# Patient Record
Sex: MALE | Race: WHITE | URBAN - METROPOLITAN AREA
[De-identification: names, ages, dates, MRNs, and addresses within clinical notes are randomized per-mention and may not be internally consistent; named-entity substitution may affect disease eponyms.]

---

## 2018-06-27 ENCOUNTER — TRANSFERRED RECORDS (OUTPATIENT)
Dept: HEALTH INFORMATION MANAGEMENT | Facility: CLINIC | Age: 33
End: 2018-06-27

## 2018-08-14 DIAGNOSIS — H53.10 SUBJECTIVE VISUAL DISTURBANCE: Primary | ICD-10-CM

## 2018-08-16 ENCOUNTER — OFFICE VISIT (OUTPATIENT)
Dept: OPHTHALMOLOGY | Facility: CLINIC | Age: 33
End: 2018-08-16
Attending: OPHTHALMOLOGY
Payer: COMMERCIAL

## 2018-08-16 DIAGNOSIS — H50.22 HYPERTROPIA OF LEFT EYE: Primary | ICD-10-CM

## 2018-08-16 DIAGNOSIS — H53.10 SUBJECTIVE VISUAL DISTURBANCE: ICD-10-CM

## 2018-08-16 PROCEDURE — G0463 HOSPITAL OUTPT CLINIC VISIT: HCPCS | Mod: ZF

## 2018-08-16 PROCEDURE — 92060 SENSORIMOTOR EXAMINATION: CPT | Mod: ZF | Performed by: OPHTHALMOLOGY

## 2018-08-16 ASSESSMENT — EXTERNAL EXAM - RIGHT EYE: OD_EXAM: NORMAL

## 2018-08-16 ASSESSMENT — VISUAL ACUITY
OS_CC: 20/20
METHOD: SNELLEN - LINEAR
CORRECTION_TYPE: GLASSES
OD_CC: 20/20

## 2018-08-16 ASSESSMENT — CONF VISUAL FIELD
OS_NORMAL: 1
OD_NORMAL: 1

## 2018-08-16 ASSESSMENT — CUP TO DISC RATIO
OS_RATIO: 0.3
OD_RATIO: 0.3

## 2018-08-16 ASSESSMENT — REFRACTION_WEARINGRX
OD_SPHERE: -2.25
OS_CYLINDER: +1.00
OS_AXIS: 077
OS_SPHERE: -2.75
OD_CYLINDER: +0.50
OD_AXIS: 164

## 2018-08-16 ASSESSMENT — TONOMETRY
IOP_METHOD: ICARE
OD_IOP_MMHG: 10
OS_IOP_MMHG: 10

## 2018-08-16 ASSESSMENT — SLIT LAMP EXAM - LIDS
COMMENTS: NORMAL
COMMENTS: NORMAL

## 2018-08-16 ASSESSMENT — EXTERNAL EXAM - LEFT EYE: OS_EXAM: NORMAL

## 2018-08-16 NOTE — MR AVS SNAPSHOT
After Visit Summary   2018    Blaine Griffin    MRN: [id number]           Patient Information     Date Of Birth          1985        Visit Information        Provider Department      2018 1:30 PM [facility]        Today's Diagnoses     Hypertropia of left eye    -  1    Subjective visual disturbance           Follow-ups after your visit        Who to contact     Please call your clinic at [phone] to:    Ask questions about your health    Make or cancel appointments    Discuss your medicines    Learn about your test results    Speak to your doctor            Additional Information About Your Visit        MyChart Information     [employer] is an electronic gateway that provides easy, online access to your medical records. With [employer], you can request a clinic appointment, read your test results, renew a prescription or communicate with your care team.     To sign up for [employer] visit the website at [email or web address]   You will be asked to enter the access code listed below, as well as some personal information. Please follow the directions to create your username and password.     Your access code is: [email or web address]  Expires: 10/31/2018  6:30 AM     Your access code will  in 90 days. If you need help or a new code, please contact your [facility] or call [phone] for assistance.        Care EveryWhere ID     This is your Care EveryWhere ID. This could be used by other organizations to access your [facility] medical records  [id number]         Blood Pressure from Last 3 Encounters:   07/15/16 130/88    Weight from Last 3 Encounters:   07/15/16 82.6 kg (182 lb)              We Performed the Following     DILATED FUNDUS EXAM     IOP Measurement     Sensorimotor          Today's Medication Changes          These changes are accurate as of 18  3:06 PM.  If you have any questions, ask your nurse or doctor.                Stop taking these medicines if you haven't already. Please contact your care team if you have questions.     acetaminophen-codeine 300-30 MG per tablet   Commonly known as:  TYLENOL w/CODEINE No. 3   Stopped by:  Ganesh Cardenas MD                    Primary Care Provider    None Specified       No primary provider on file.        Equal Access to Services     [facility]: Hadii rodrigo ku hadasho Soomaali, waaxda luqadaha, qaybta kaalmada adeegyada, waxay martine garciaailinquintin boles . So [facility] [place] [facility] [phone].    ATENCIÓN: Si habla español, tiene a javed disposición servicios gratuitos de asistencia lingüística. [facility] [phone].    We comply with applicable federal civil rights laws and [place] laws. We do not discriminate on the basis of race, color, national origin, age, disability, sex, sexual orientation, or gender identity.            Thank you!     Thank you for choosing [facility]  for your care. Our goal is always to provide you with excellent care. Hearing back from our patients is one way we can continue to improve our services. Please take a few minutes to complete the written survey that you may receive in the mail after your visit with us. Thank you!             Your Updated Medication List - Protect others around you: Learn how to safely use, store and throw away your medicines at www.disposemymeds.org.      Notice  As of 8/16/2018  3:06 PM    You have not been prescribed any medications.

## 2018-08-16 NOTE — LETTER
"2018         RE:  :  MRN: Blaine Griffin  1985  [id number]     Dear Dr. Mars,    Thank you for asking me to see your very pleasant patient, Blaine Griffin, in neuro-ophthalmic consultation.  I would like to thank you for sending your records and I have summarized them in the history of present illness.  My assessment and plan are below.  For further details, please see my attached clinic note.          Assessment & Plan     Blaine Griffin is a 33 year old male with the following diagnoses:   1. Hypertropia of left eye    2. Subjective visual disturbance       Referred by Dr. Mars for evaluation of eye strain. 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 S/p left inferior oblique recession 12mm 2016. Three months postop, was having difficulty with eye strain, particularly at mid-distance. Tried to update glasses prescription shortly thereafter without improvement. Feel eye discomfort in both eyes, particularly when looking at someone across the table at a meeting or changing focus between his computer and a mid-distance target. Experiences \"visual distortion\" when trying to focus on something at mid-distance after a few seconds, like things are not as sharp and the periphery is blurred. The longer he looks at a certain distance, the more comfortable it gets. Not improved with glasses off. Does not help to cover one eye. Symptoms have been pretty stable since they started 2 years ago. Denies diplopia or associated headaches.    No other ocular or medical history. No medications.    Family history of trigeminal neuralgia in father.    Best corrected visual acuity today is 20/20 both eyes. Normal intraocular pressures and color vision in both eyes. Pupils briskly reactive with relative afferent pupillary defect. Confrontational visual fields and extraocular movements full in both eyes. 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 Slit lamp exam is normal. Dilated fundus exam shows healthy nerves and maculae.    I am not sure what he is describing exactly.  It doesn't seem like an eye alignment issue given that it does not improve substantially with monocular eye closure. He notes that it is ok at close near distances making latent hyperopia or poor accommodation less likely.   I do not believe that his symptoms warrant an MRI at this time. Would recommend observation.  Follow up as needed for worsening symptoms.           Again, thank you for allowing me to participate in the care of your patient.      Sincerely,    Ganesh Cardenas MD  Professor, Neuro-Ophthalmology  Department of Ophthalmology and Visual Neurosciences  [facility]

## 2018-08-16 NOTE — NURSING NOTE
Chief Complaints and History of Present Illnesses   Patient presents with     Neurologic Problem     DIPLOPIA     HPI    Symptoms:              Comments:  Blaine is a 33 year old male presenting for complaints of:    1. Subjective visual disturbance.   - S/p LEE recession 07/2016.   - History of Left fourth nerve palsy. Longstanding. Started experiencing diplopia in college.   - no diplopia, but experiences discomfort and visual distortion at different distances. Strain.     Maciej LAL 1:47 PM August 16, 2018

## 2018-08-16 NOTE — PROGRESS NOTES
"       Assessment & Plan     Blaine Griffin is a 33 year old male with the following diagnoses:   1. Hypertropia of left eye    2. Subjective visual disturbance       Referred by Dr. Mars for evaluation of eye strain. 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 S/p left inferior oblique recession 12mm July 2016. Three months postop, was having difficulty with eye strain, particularly at mid-distance. Tried to update glasses prescription shortly thereafter without improvement. Feel eye discomfort in both eyes, particularly when looking at someone across the table at a meeting or changing focus between his computer and a mid-distance target. Experiences \"visual distortion\" when trying to focus on something at mid-distance after a few seconds, like things are not as sharp and the periphery is blurred. The longer he looks at a certain distance, the more comfortable it gets. Not improved with glasses off. Does not help to cover one eye. Symptoms have been pretty stable since they started 2 years ago. Denies diplopia or associated headaches.    No other ocular or medical history. No medications.    Family history of trigeminal neuralgia in father.    Best corrected visual acuity today is 20/20 both eyes. Normal intraocular pressures and color vision in both eyes. Pupils briskly reactive with relative afferent pupillary defect. Confrontational visual fields and extraocular movements full in both eyes. There is a 2 prism diopter left hypertropia and 4 prism diopter exotropia in primary gaze. Slit lamp exam is normal. Dilated fundus exam shows healthy nerves and maculae.    I am not sure what he is describing exactly.  It doesn't seem like an eye alignment issue given that it does not improve substantially with monocular eye closure. He notes that it is ok at close near distances making latent hyperopia or poor accommodation less likely.   I do not believe that his symptoms warrant an MRI at " this time. Would recommend observation.  Follow up as needed for worsening symptoms.            Attending Physician Attestation:  Complete documentation of historical and exam elements from today's encounter can be found in the full encounter summary report (not reduplicated in this progress note).  I personally obtained the chief complaint(s) and history of present illness.  I confirmed and edited as necessary the review of systems, past medical/surgical history, family history, social history, and examination findings as documented by others; and I examined the patient myself.  I personally reviewed the relevant tests, images, and reports as documented above.  I formulated and edited as necessary the assessment and plan and discussed the findings and management plan with the patient and family. - Ganesh Cardenas MD